# Patient Record
Sex: MALE | Race: WHITE
[De-identification: names, ages, dates, MRNs, and addresses within clinical notes are randomized per-mention and may not be internally consistent; named-entity substitution may affect disease eponyms.]

---
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
## 2021-03-26 ENCOUNTER — HOSPITAL ENCOUNTER (OUTPATIENT)
Dept: HOSPITAL 63 - CT | Age: 26
End: 2021-03-26
Attending: PHYSICIAN ASSISTANT
Payer: COMMERCIAL

## 2021-03-26 DIAGNOSIS — R10.11: Primary | ICD-10-CM

## 2021-03-26 DIAGNOSIS — R63.4: ICD-10-CM

## 2021-03-26 PROCEDURE — 74177 CT ABD & PELVIS W/CONTRAST: CPT

## 2021-03-26 RX ADMIN — IOHEXOL ONE ML: 240 INJECTION, SOLUTION INTRATHECAL; INTRAVASCULAR; INTRAVENOUS; ORAL at 08:00

## 2021-03-26 RX ADMIN — IOHEXOL ONE ML: 300 INJECTION, SOLUTION INTRAVENOUS at 08:00

## 2021-03-26 NOTE — RAD
CT ABDOMEN+PELVIS W



History: Reason: RUQ ABD PAIN WEIGHT LOSS / Spl. Instructions: DRINKING [id number] / History: 



Technique:  After the administration of intravenous contrast, CT imaging was performed of the abdomen
 and pelvis.  Multiplanar images are reviewed. 



Exposure: One or more of the following individualized dose reduction techniques were utilized for thi
s examination:  

1. Automated exposure control  

2. Adjustment of the mA and/or kV according to patient size  

3. Use of iterative reconstruction technique.



Comparison:  None



Findings:

Lower chest: No consolidation or pleural effusion.



Abdomen and pelvis: The liver, spleen, adrenal glands, pancreas and gallbladder are unremarkable. No 
biliary ductal dilatation. Patent portal veins. Normal appearance the kidneys. No hydronephrosis. No 
renal calculi.



Distended stool-filled rectum. Normal appendix. No evidence of bowel obstruction. Oral contrast opaci
fies to the level of the mid small bowel. No pathologic lymphadenopathy. No ascites.



Bones: L4 bilateral spondylolysis. No significant anterolisthesis.



Impression:

1.  No acute abdominal or pelvic pathology.

2.  Distended stool-filled rectum.





Electronically signed by: Quinton Diaz DO (3/26/2021 9:05 AM) [id number]